# Patient Record
Sex: FEMALE | Race: WHITE | URBAN - METROPOLITAN AREA
[De-identification: names, ages, dates, MRNs, and addresses within clinical notes are randomized per-mention and may not be internally consistent; named-entity substitution may affect disease eponyms.]

---

## 2017-10-06 ENCOUNTER — IMPORTED ENCOUNTER (OUTPATIENT)
Dept: URBAN - METROPOLITAN AREA CLINIC 31 | Facility: CLINIC | Age: 62
End: 2017-10-06

## 2017-10-06 PROBLEM — H25.13: Noted: 2017-10-06

## 2017-10-06 PROBLEM — H02.834: Noted: 2017-10-06

## 2017-10-06 PROBLEM — H02.831: Noted: 2017-10-06

## 2017-10-06 PROBLEM — H04.123: Noted: 2017-10-06

## 2017-10-06 PROBLEM — E11.9: Noted: 2017-10-06

## 2017-10-06 PROCEDURE — 92015 DETERMINE REFRACTIVE STATE: CPT

## 2017-10-06 PROCEDURE — 92004 COMPRE OPH EXAM NEW PT 1/>: CPT

## 2017-11-10 ENCOUNTER — IMPORTED ENCOUNTER (OUTPATIENT)
Dept: URBAN - METROPOLITAN AREA CLINIC 31 | Facility: CLINIC | Age: 62
End: 2017-11-10

## 2017-11-10 PROBLEM — H02.831: Noted: 2017-11-10

## 2017-11-10 PROBLEM — E11.9: Noted: 2017-11-10

## 2017-11-10 PROBLEM — H04.123: Noted: 2017-11-10

## 2017-11-10 PROBLEM — H02.834: Noted: 2017-11-10

## 2017-11-10 PROBLEM — H25.13: Noted: 2017-11-10

## 2017-11-10 PROCEDURE — 92012 INTRM OPH EXAM EST PATIENT: CPT

## 2017-11-10 NOTE — PATIENT DISCUSSION
1. DM II without sign of Diabetic Retinopathy OU:    A1C getting better since off Steroids. Discussed the pathophysiology of diabetes and its effect on the eye. Stressed the importance of regular followup and good control of BS BP and Lipids to avoid future complications. 2. Dry Eye OU:  Pt feels not much improvement with plugs. .. Shirmers 25/25. Flooded. Pt wants to try Keegan [place]. Pt using gel drops 3x per day and eyes feel pio and burn about afternoon. Continue current management with gel drops 4x and refresh PM sapphire at bedtime. Disc plugs vs Restasis and opted for plugs. Disc Maxitears and will check with her allergist to see if can take. Rx Xiidra bid. RTN 6 weeks OC   3. Trace Nuclear Sclerotic Cataract OU: Explained how cataracts can effect vision. Recommend clinical observation. The patient was advised to contact us if any change or worsening of vision. 4. Dermatochalasis OU:  Patient currently symptomatic. Refer to DR Fong or DR Fan./5.  Rx change6. Hx Lymphoma CA Fibromyalgia-  Gets IGG shots. 7.  Seeing allergist for shots starting today. 8. RTN 6 weeks OC - check dryness after starting Keegan Kekaha.  9. RTn 1 yr CE

## 2018-07-10 ENCOUNTER — IMPORTED ENCOUNTER (OUTPATIENT)
Dept: URBAN - METROPOLITAN AREA CLINIC 31 | Facility: CLINIC | Age: 63
End: 2018-07-10

## 2018-07-10 PROBLEM — H02.831: Noted: 2018-07-10

## 2018-07-10 PROBLEM — E11.9: Noted: 2018-07-10

## 2018-07-10 PROBLEM — H25.13: Noted: 2018-07-10

## 2018-07-10 PROBLEM — H02.834: Noted: 2018-07-10

## 2018-07-10 PROBLEM — H04.123: Noted: 2018-07-10

## 2018-07-10 PROCEDURE — 99214 OFFICE O/P EST MOD 30 MIN: CPT

## 2018-07-10 NOTE — PATIENT DISCUSSION
1. DM II--NO BDR OU:     A1C getting better since off Steroids. Discussed the pathophysiology of diabetes and its effect on the eye. Stressed the importance of regular followup and good control of BS BP and Lipids to avoid future complications. 2. Dry Eye OU/Sjogrens:  Pt feels better with plugs. .. Shirmers 25/25. Flooded. Pt wants have perm plugs put in. Inserted 6mm PPP ou LOwers sc incident. Clark Aldridge too expensive. Cont gel drops 3x per day and Refresh PM sapphire at bedtime. Disc plugs vs Restasis and opted for plugs. Disc Maxitears and will check with her allergist to see if can take. 3.  Trace Nuclear Sclerotic Cataract OU: Explained how cataracts can effect vision. Recommend clinical observation. The patient was advised to contact us if any change or worsening of vision. 4. Dermatochalasis OU:  Patient currently symptomatic. Makes pt feel lids and eyes are tired. Refer to DR Fong or DR Fan./5. Dx Sjogrens  6/18- no meds yet. 6. Hx Lymphoma CA Fibromyalgia-  Gets IGG shots. 7.  Seeing allergist for shots starting today. 8. RTN 6 weeks OC - check dryness after PPP9.   RTN 10/18 CE

## 2018-08-14 ENCOUNTER — IMPORTED ENCOUNTER (OUTPATIENT)
Dept: URBAN - METROPOLITAN AREA CLINIC 31 | Facility: CLINIC | Age: 63
End: 2018-08-14

## 2018-08-14 PROBLEM — H02.834: Noted: 2018-08-14

## 2018-08-14 PROBLEM — H02.831: Noted: 2018-08-14

## 2018-08-14 PROBLEM — E11.9: Noted: 2018-08-14

## 2018-08-14 PROBLEM — H04.123: Noted: 2018-08-14

## 2018-08-14 PROBLEM — H25.13: Noted: 2018-08-14

## 2018-08-14 PROCEDURE — 99214 OFFICE O/P EST MOD 30 MIN: CPT

## 2018-08-14 PROCEDURE — A4263 PERMANENT TEAR DUCT PLUG: HCPCS

## 2018-08-14 NOTE — PATIENT DISCUSSION
1. DM II--NO BDR OU:     A1C getting better since off Steroids. Discussed the pathophysiology of diabetes and its effect on the eye. Stressed the importance of regular followup and good control of BS BP and Lipids to avoid future complications. 2. Dry Eye OU/Sjogrens:  Pt does well with PPP. Pt lost her right PPP. Inserted 7mm PPP  in OD LOwers sc incident. Cookie Buys too expensive. Cont gel drops 3x per day and Refresh PM sapphire at bedtime. Maxitears and will check with her allergist to see if can take. 3.  Trace Nuclear Sclerotic Cataract OU: Explained how cataracts can effect vision. Recommend clinical observation. The patient was advised to contact us if any change or worsening of vision. 4. Dermatochalasis OU:  Patient has been approved for lid sx and getting lowers too. .5. Dx Sjogrens  6/18- no meds yet. 6. Hx Lymphoma CA Fibromyalgia-  Gets IGG shots. 7.  Seeing allergist for shots starting today. 8. RTN 4 weeks OC - check dryness/Shirmers after PPP9.   RTN 10/18 CE

## 2018-09-18 ENCOUNTER — IMPORTED ENCOUNTER (OUTPATIENT)
Dept: URBAN - METROPOLITAN AREA CLINIC 31 | Facility: CLINIC | Age: 63
End: 2018-09-18

## 2018-09-18 PROBLEM — H02.831: Noted: 2018-09-18

## 2018-09-18 PROBLEM — E11.9: Noted: 2018-09-18

## 2018-09-18 PROBLEM — H04.123: Noted: 2018-09-18

## 2018-09-18 PROBLEM — H02.834: Noted: 2018-09-18

## 2018-09-18 PROBLEM — H25.13: Noted: 2018-09-18

## 2018-09-18 PROBLEM — H02.832: Noted: 2018-09-18

## 2018-09-18 PROCEDURE — 99213 OFFICE O/P EST LOW 20 MIN: CPT

## 2018-09-18 NOTE — PATIENT DISCUSSION
1. DM II--NO BDR OU:     A1C getting better since off Steroids. Discussed the pathophysiology of diabetes and its effect on the eye. Stressed the importance of regular followup and good control of BS BP and Lipids to avoid future complications. 2. Dry Eye OU/Sjogrens:  Pt does well with PPP. Pt has PPP in RLLL but none in left. Shavonne Olvera too expensive. Cont gel drops 3x per day and Refresh PM sapphire at bedtime. Maxitears and will check with her allergist to see if can take. 3.  Trace Nuclear Sclerotic Cataract OU: Explained how cataracts can effect vision. Recommend clinical observation. The patient was advised to contact us if any change or worsening of vision. 4. Dermatochalasis OU:  Patient has been approved for lid sx and getting lowers too. .  sx 10/1/18 [place]5. Dx Sjogrens  6/18- no meds yet. 6. Hx Lymphoma CA Fibromyalgia-  Gets IGG shots. 7.  Seeing allergist for shots starting today. 8.   RTN  12/18 CE

## 2019-01-15 ENCOUNTER — IMPORTED ENCOUNTER (OUTPATIENT)
Dept: URBAN - METROPOLITAN AREA CLINIC 31 | Facility: CLINIC | Age: 64
End: 2019-01-15

## 2019-01-15 PROBLEM — H02.831: Noted: 2019-01-15

## 2019-01-15 PROBLEM — E11.9: Noted: 2019-01-15

## 2019-01-15 PROBLEM — H02.834: Noted: 2019-01-15

## 2019-01-15 PROBLEM — H04.123: Noted: 2019-01-15

## 2019-01-15 PROBLEM — H25.13: Noted: 2019-01-15

## 2019-01-15 PROCEDURE — 92015 DETERMINE REFRACTIVE STATE: CPT

## 2019-01-15 PROCEDURE — 92014 COMPRE OPH EXAM EST PT 1/>: CPT

## 2019-01-15 NOTE — PATIENT DISCUSSION
1. DM II--NO BDR OU:     A1C getting better since off Steroids. Discussed the pathophysiology of diabetes and its effect on the eye. Stressed the importance of regular followup and good control of BS BP and Lipids to avoid future complications. 2. Dry Eye OU/Sjogrens:  Pt doing well with drops and sapphire. Plugs are gone ou. Pt feels dryness is much better since lid sx. Hong Stan too expensive. Cont gel drops 3x per day and Refresh PM sapphire at bedtime. Maxitears and will check with her allergist to see if can take. 3.  Trace Nuclear Sclerotic Cataract OU: Explained how cataracts can effect vision. Recommend clinical observation. The patient was advised to contact us if any change or worsening of vision. 4. S/P BULB 10/18 DR Fong5. Dx Sjogrens  6/18- no meds yet. 6. Hx Lymphoma CA Fibromyalgia-  Gets IGG shots. 7.  Seeing allergist for shots-1x per week. 8. Wants reading rx only9.   RTN  6/19 OC/yessica/tear lab

## 2019-07-12 ENCOUNTER — IMPORTED ENCOUNTER (OUTPATIENT)
Dept: URBAN - METROPOLITAN AREA CLINIC 31 | Facility: CLINIC | Age: 64
End: 2019-07-12

## 2019-07-12 PROBLEM — H02.831: Noted: 2019-07-12

## 2019-07-12 PROBLEM — E11.9: Noted: 2019-07-12

## 2019-07-12 PROBLEM — H02.834: Noted: 2019-07-12

## 2019-07-12 PROBLEM — H04.123: Noted: 2019-07-12

## 2019-07-12 PROBLEM — H25.13: Noted: 2019-07-12

## 2019-07-12 PROCEDURE — 99213 OFFICE O/P EST LOW 20 MIN: CPT

## 2019-07-12 PROCEDURE — 83861 MICROFLUID ANALY TEARS: CPT

## 2019-07-12 NOTE — PATIENT DISCUSSION
1. DM II--NO BDR OU:     A1C getting better since off Steroids. Discussed the pathophysiology of diabetes and its effect on the eye. Stressed the importance of regular followup and good control of BS BP and Lipids to avoid future complications. 2. Dry Eye OU/Sjogrens:  Pt doing well with drops and sapphire. Plugs are gone ou. Pt feels dryness is much better since lid sx. Mau Sprinkles too expensive. Cont gel drops 3x per day and Refresh PM sapphire at bedtime. Maxitears and will check with her allergist to see if can take. Al[place] 13/7. Tear Lab  302/293.3. Trace Nuclear Sclerotic Cataract OU: Explained how cataracts can effect vision. Recommend clinical observation. The patient was advised to contact us if any change or worsening of vision. 4. S/P BULB 10/18 DR Fong5. Dx Sjogrens  6/18- no meds yet. 6. Hx Lymphoma CA Fibromyalgia-  Gets IGG shots. --  CA returned and will be starting radiation 8/19.7. Seeing allergist for shots-1x per week. 8. Wants reading rx only9.   RTN  1/20 CE/alka/tear lab

## 2019-12-31 NOTE — PATIENT DISCUSSION
New Prescription: doxycycline hyclate (doxycycline hyclate): tablet: 100 mg 1 tablet twice a day as directed by mouth 12-

## 2019-12-31 NOTE — PATIENT DISCUSSION
CHALAZION OS: PRESCRIBED WARM COMPRESSES, EYELID SCRUBS AND DOXYCYCLINE 100MG BID PO X 2 WEEKS AND MAXITROL KENYATTA QHS X 2 WEEKS. PROCEED WITH INCISION AND DRAINAGE TODAY.

## 2019-12-31 NOTE — PATIENT DISCUSSION
New Prescription: Maxitrol (neomycin-polymyxin-dexameth): ointment: 3.5-10,000-0.1 mg-unit/g-% a small amount at bedtime as directed into both eyes 12-

## 2020-07-15 NOTE — PATIENT DISCUSSION
1. DM II without sign of Diabetic Retinopathy OU:    A1C getting better since off Steroids. Discussed the pathophysiology of diabetes and its effect on the eye. Stressed the importance of regular followup and good control of BS BP and Lipids to avoid future complications. 2. Dry Eye OU:   Pt using gel drops 3x per day and eyes feel pio and burn about afternoon. Continue current management with gel drops 4x and refresh PM sapphire at bedtime. Disc plugs vs Restasis and opted for plugs. Disc Maxitears and will check with her allergist to see if can take. Inserted TPP 4 punctas-  5mm ou lowers and 3mm in ou uppers. 3.  Trace Nuclear Sclerotic Cataract OU: Explained how cataracts can effect vision. Recommend clinical observation. The patient was advised to contact us if any change or worsening of vision. 4. Dermatochalasis OU:  Patient currently symptomatic. Refer to DR Fong or DR Fan./5.  Rx change6. Hx Lymphoma CA Fibromyalgia-  Gets IGG shots. 7.  Seeing allergist for shots starting today. 8. RTN 4-6 weeks OC - check dryness9.  RTn 1 yr CE · Bipolar disorder follows with Dr Gregoroi Bridges    Continue clonazepam, lamictal, topiramate, and quetiapine

## 2020-08-28 ENCOUNTER — IMPORTED ENCOUNTER (OUTPATIENT)
Dept: URBAN - METROPOLITAN AREA CLINIC 31 | Facility: CLINIC | Age: 65
End: 2020-08-28

## 2020-08-28 PROBLEM — E11.9: Noted: 2020-08-28

## 2020-08-28 PROBLEM — H25.13: Noted: 2020-08-28

## 2020-08-28 PROBLEM — H02.831: Noted: 2020-08-28

## 2020-08-28 PROBLEM — H02.834: Noted: 2020-08-28

## 2020-08-28 PROBLEM — H04.123: Noted: 2020-08-28

## 2020-08-28 PROCEDURE — 92014 COMPRE OPH EXAM EST PT 1/>: CPT

## 2020-08-28 PROCEDURE — 92015 DETERMINE REFRACTIVE STATE: CPT

## 2020-08-28 PROCEDURE — 83861 MICROFLUID ANALY TEARS: CPT

## 2020-08-28 NOTE — PATIENT DISCUSSION
1. DM II--NO BDR OU:     A1C getting better since off Steroids. Discussed the pathophysiology of diabetes and its effect on the eye. Stressed the importance of regular followup and good control of BS BP and Lipids to avoid future complications. 2. Dry Eye OU/Sjogrens:  Pt doing well with drops and sapphire. Plugs are gone ou. Pt feels dryness is much better since lid sx. Demarionis Kanaris too expensive but we will send to pharmacy and seee if gets approved-- Cont gel drops 3x per day and Refresh PM sapphire at bedtime. Maxitears and will check with her allergist to see if can take. [place] 13/7. Tear Lab  300/310 from 302/293.3. Trace Nuclear Sclerotic Cataract OU: Explained how cataracts can effect vision. Recommend clinical observation. The patient was advised to contact us if any change or worsening of vision. 4. S/P BULB 10/18 DR Fong5. Dx Sjogrens  6/18- no meds yet. 6. Hx Lymphoma CA Fibromyalgia-  Gets IGG shots. --  CA returned and will be starting radiation 8/19.7. Seeing allergist for shots-1x per week. 8. Rx change---Wants reading rx only9. RTN  1/21  DF10.   RTN 8/21 CE/Alka/Tear Lab

## 2021-02-19 ENCOUNTER — IMPORTED ENCOUNTER (OUTPATIENT)
Dept: URBAN - METROPOLITAN AREA CLINIC 31 | Facility: CLINIC | Age: 66
End: 2021-02-19

## 2021-02-19 PROBLEM — E11.9: Noted: 2021-02-19

## 2021-02-19 PROBLEM — H02.831: Noted: 2021-02-19

## 2021-02-19 PROBLEM — H25.13: Noted: 2021-02-19

## 2021-02-19 PROBLEM — H02.834: Noted: 2021-02-19

## 2021-02-19 PROBLEM — H04.123: Noted: 2021-02-19

## 2021-02-19 PROCEDURE — 99214 OFFICE O/P EST MOD 30 MIN: CPT

## 2021-02-19 NOTE — PATIENT DISCUSSION
1. DM II--NO BDR OU:     A1C  5.1--Pt lost 30 pounds on [employer]. Discussed the pathophysiology of diabetes and its effect on the eye. Stressed the importance of regular followup and good control of BS BP and Lipids to avoid future complications. 2. Dry Eye OU/Sjogrens:  Pt doing well with drops and sapphire. Plugs are gone ou. Pt using Restasis bid. Hardly using any terars. Has gel drops. Maxitears and will check with her allergist to see if can take. [place] 13/7. Tear Lab  300/310 from 302/293.3. Trace Nuclear Sclerotic Cataract OU: Explained how cataracts can effect vision. Recommend clinical observation. The patient was advised to contact us if any change or worsening of vision. 4. S/P BULB 10/18 DR Fong5. Dx Sjogrens  6/18- no meds yet. 6. Hx Lymphoma CA Fibromyalgia-  Gets IGG shots. --  CA in remission. 7. Seeing allergist for shots-1x per week. 8. Schedule cl fitting-- MONO--     My day 1.75/3.50  9.   RTN 8/21 CE/Alka/Tear Lab

## 2021-03-01 ENCOUNTER — IMPORTED ENCOUNTER (OUTPATIENT)
Dept: URBAN - METROPOLITAN AREA CLINIC 31 | Facility: CLINIC | Age: 66
End: 2021-03-01

## 2021-03-01 PROCEDURE — 92310 CONTACT LENS FITTING OU: CPT

## 2021-03-01 NOTE — PATIENT DISCUSSION
1.  Disp trials of MONO--MY Day 1.75/3.50. Increase wt slwly and use tears often. Tauhgt I/R and care Eval 1302.   RTN 1 week clck

## 2021-03-08 ENCOUNTER — IMPORTED ENCOUNTER (OUTPATIENT)
Dept: URBAN - METROPOLITAN AREA CLINIC 31 | Facility: CLINIC | Age: 66
End: 2021-03-08

## 2021-03-10 ENCOUNTER — IMPORTED ENCOUNTER (OUTPATIENT)
Dept: URBAN - METROPOLITAN AREA CLINIC 31 | Facility: CLINIC | Age: 66
End: 2021-03-10

## 2021-03-10 NOTE — PATIENT DISCUSSION
1. Presbyope-- pT doing well with cls--up to 6-7 hrs and using tears. Needs more near rx. Disp trials of MONO--MY Day 2.25/4.00-0.75x090.  10 trials. Wanted 3.75wc for left eye but did not have it in stock.   2.  RTN 1 week clck

## 2021-03-18 ENCOUNTER — IMPORTED ENCOUNTER (OUTPATIENT)
Dept: URBAN - METROPOLITAN AREA CLINIC 31 | Facility: CLINIC | Age: 66
End: 2021-03-18

## 2021-10-06 ENCOUNTER — IMPORTED ENCOUNTER (OUTPATIENT)
Dept: URBAN - METROPOLITAN AREA CLINIC 31 | Facility: CLINIC | Age: 66
End: 2021-10-06

## 2021-10-06 PROBLEM — E11.9: Noted: 2021-10-06

## 2021-10-06 PROBLEM — H04.123: Noted: 2021-10-06

## 2021-10-06 PROBLEM — H25.13: Noted: 2021-10-06

## 2021-10-06 PROBLEM — H02.834: Noted: 2021-10-06

## 2021-10-06 PROBLEM — H25.11: Noted: 2021-10-06

## 2021-10-06 PROBLEM — H02.831: Noted: 2021-10-06

## 2021-10-06 PROBLEM — H25.12: Noted: 2021-10-06

## 2021-10-06 PROBLEM — H16.223: Noted: 2021-10-06

## 2021-10-06 PROCEDURE — 92014 COMPRE OPH EXAM EST PT 1/>: CPT

## 2021-10-06 PROCEDURE — 92015 DETERMINE REFRACTIVE STATE: CPT

## 2021-10-06 NOTE — PATIENT DISCUSSION
1. DM II--NO BDR OU:     A1C  5.1--Pt lost 30 pounds on [employer]. Discussed the pathophysiology of diabetes and its effect on the eye. Stressed the importance of regular followup and good control of BS BP and Lipids to avoid future complications. 2. Dry Eye OU/Sjogrens:  Doing well with drops and sapphire. Plugs are gone ou. Pt using Restasis bid. Hardly using any tears. Has gel drops. Ana. Alka 5/11 from 13/7. Tear Lab  300/310 from 302/293.3. Trace Nuclear Sclerotic Cataract OU: Explained how cataracts can effect vision. Recommend clinical observation. The patient was advised to contact us if any change or worsening of vision. 4. S/P BULB 10/18 DR Fong5. Dx Sjogrens  6/18- no meds yet. 6. Hx Lymphoma CA Fibromyalgia-  Gets IGG shots. --  CA in remission. 7. Seeing allergist for shots-1x per week. 8. Pt doing well with cls--up to 6-7 hrs and using tears. Cont with MONO--MY Day 2.25/4.00-0.75x090. NO FIT TODAY--Do not wear untiol pt is off steroid--drying pt out. 9.  RTN  4/22 OC/Alka/Tear Lab10.   RTN 1 yr CE/Tear lab

## 2022-04-01 ASSESSMENT — TONOMETRY
OS_IOP_MMHG: 14
OD_IOP_MMHG: 15
OD_IOP_MMHG: 11
OD_IOP_MMHG: 12
OS_IOP_MMHG: 13
OD_IOP_MMHG: 14
OS_IOP_MMHG: 16
OS_IOP_MMHG: 15
OD_IOP_MMHG: 14
OS_IOP_MMHG: 12

## 2022-04-01 ASSESSMENT — VISUAL ACUITY
OS_SC: 20/25
OD_SC: 20/20-2
OS_SC: 20/25
OS_SC: 20/30+1
OS_SC: 20/20-1
OS_SC: 20/25
OS_CC: J1+
OS_SC: 20/25
OD_SC: 20/20
OS_CC: 20/40
OD_CC: J1+
OD_SC: 20/25
OD_CC: 20/50
OD_SC: 20/25-3
OD_SC: 20/25
OD_SC: 20/30-2

## 2022-05-11 ENCOUNTER — ESTABLISHED PATIENT (OUTPATIENT)
Dept: URBAN - METROPOLITAN AREA CLINIC 34 | Facility: CLINIC | Age: 67
End: 2022-05-11

## 2022-05-11 DIAGNOSIS — H04.123: ICD-10-CM

## 2022-05-11 PROCEDURE — 99213 OFFICE O/P EST LOW 20 MIN: CPT

## 2022-05-11 ASSESSMENT — VISUAL ACUITY
OD_CC: 20/25
OS_CC: 20/25

## 2022-05-11 ASSESSMENT — TONOMETRY
OD_IOP_MMHG: 14
OS_IOP_MMHG: 14

## 2022-05-11 NOTE — PATIENT DISCUSSION
Pt doing well with cls--up to 6-7 hrs and using tears. Cont with MONO--MY Day +2.25/+4.00-0.75x090. NO FIT TODAY--.

## 2022-05-11 NOTE — PATIENT DISCUSSION
Sjogrens: Doing well with drops and sapphire. Plugs are gone ou. Pt using Restasis bid. Hardly using any tears. Has gel drops. Lisa Rucker 5/11 from 13/7. Tear Lab 300/310 from 302/293. Monitor.

## 2022-11-10 ENCOUNTER — ESTABLISHED PATIENT (OUTPATIENT)
Dept: URBAN - METROPOLITAN AREA CLINIC 34 | Facility: CLINIC | Age: 67
End: 2022-11-10

## 2022-11-10 DIAGNOSIS — H25.89: ICD-10-CM

## 2022-11-10 DIAGNOSIS — H04.123: ICD-10-CM

## 2022-11-10 DIAGNOSIS — H25.13: ICD-10-CM

## 2022-11-10 PROCEDURE — 83861 MICROFLUID ANALY TEARS: CPT

## 2022-11-10 PROCEDURE — 92014 COMPRE OPH EXAM EST PT 1/>: CPT

## 2022-11-10 PROCEDURE — 92015 DETERMINE REFRACTIVE STATE: CPT

## 2022-11-10 ASSESSMENT — VISUAL ACUITY
OS_CC: J3
OS_BAT: 20/70
OS_CC: 20/50-1
OD_BAT: 20/50
OD_CC: J3
OD_CC: 20/40

## 2022-11-10 ASSESSMENT — TONOMETRY
OD_IOP_MMHG: 14
OS_IOP_MMHG: 14

## 2022-11-10 NOTE — PATIENT DISCUSSION
Cataract(s) are getting worse and starting to affcet her viisons-=-- Recommend monitoring, and patient agrees with this plan.

## 2022-11-10 NOTE — PATIENT DISCUSSION
Pt doing well with cls--up to 6-7 hrs and using tears. Cont with MONO--MY Day +2.25/+4.00-0.75x090. Has plenty at home--Near is getting harder due to cataract---NO FIT TODAY--.

## 2023-05-12 ENCOUNTER — ESTABLISHED PATIENT (OUTPATIENT)
Dept: URBAN - METROPOLITAN AREA CLINIC 34 | Facility: CLINIC | Age: 68
End: 2023-05-12

## 2023-05-12 DIAGNOSIS — H04.123: ICD-10-CM

## 2023-05-12 DIAGNOSIS — H25.13: ICD-10-CM

## 2023-05-12 DIAGNOSIS — H25.89: ICD-10-CM

## 2023-05-12 PROCEDURE — 92133 CPTRZD OPH DX IMG PST SGM ON: CPT

## 2023-05-12 PROCEDURE — 99214 OFFICE O/P EST MOD 30 MIN: CPT

## 2023-05-12 PROCEDURE — 92015 DETERMINE REFRACTIVE STATE: CPT

## 2023-05-12 ASSESSMENT — VISUAL ACUITY
OS_CC: 20/20-1
OD_CC: 20/20-1
OS_CC: 20/50+2
OD_CC: 20/25+2
OS_PH: 20/25-3

## 2023-05-12 ASSESSMENT — TONOMETRY
OD_IOP_MMHG: 19
OS_IOP_MMHG: 18

## 2024-03-27 ENCOUNTER — COMPREHENSIVE EXAM (OUTPATIENT)
Dept: URBAN - METROPOLITAN AREA CLINIC 34 | Facility: CLINIC | Age: 69
End: 2024-03-27

## 2024-03-27 DIAGNOSIS — H40.1431: ICD-10-CM

## 2024-03-27 DIAGNOSIS — H25.89: ICD-10-CM

## 2024-03-27 DIAGNOSIS — H04.123: ICD-10-CM

## 2024-03-27 DIAGNOSIS — E11.9: ICD-10-CM

## 2024-03-27 DIAGNOSIS — H25.13: ICD-10-CM

## 2024-03-27 PROCEDURE — 92015 DETERMINE REFRACTIVE STATE: CPT

## 2024-03-27 PROCEDURE — 92014 COMPRE OPH EXAM EST PT 1/>: CPT

## 2024-03-27 PROCEDURE — 92250 FUNDUS PHOTOGRAPHY W/I&R: CPT

## 2024-03-27 PROCEDURE — 92083 EXTENDED VISUAL FIELD XM: CPT

## 2024-03-27 ASSESSMENT — TONOMETRY
OS_IOP_MMHG: 17
OD_IOP_MMHG: 14

## 2024-03-27 ASSESSMENT — VISUAL ACUITY
OD_CC: 20/30
OS_CC: 20/30
OD_BAT: 20/70
OS_BAT: 20/100
OD_CC: 20/30
OS_CC: 20/40

## 2024-04-15 ENCOUNTER — CONSULTATION/EVALUATION (OUTPATIENT)
Dept: URBAN - METROPOLITAN AREA CLINIC 29 | Facility: CLINIC | Age: 69
End: 2024-04-15

## 2024-04-15 DIAGNOSIS — H40.1431: ICD-10-CM

## 2024-04-15 DIAGNOSIS — H02.834: ICD-10-CM

## 2024-04-15 DIAGNOSIS — H04.123: ICD-10-CM

## 2024-04-15 DIAGNOSIS — H02.831: ICD-10-CM

## 2024-04-15 DIAGNOSIS — E11.9: ICD-10-CM

## 2024-04-15 DIAGNOSIS — H25.13: ICD-10-CM

## 2024-04-15 DIAGNOSIS — H25.89: ICD-10-CM

## 2024-04-15 PROCEDURE — 99214 OFFICE O/P EST MOD 30 MIN: CPT

## 2024-04-15 PROCEDURE — 92136 OPHTHALMIC BIOMETRY: CPT

## 2024-04-15 ASSESSMENT — TONOMETRY
OS_IOP_MMHG: 16
OD_IOP_MMHG: 16

## 2024-04-15 ASSESSMENT — VISUAL ACUITY
OD_BAT: 20/70
OS_CC: 20/40
OS_BAT: 20/100
OD_CC: 20/30

## 2024-04-30 ENCOUNTER — SURGERY/PROCEDURE (OUTPATIENT)
Dept: URBAN - METROPOLITAN AREA SURGERY 17 | Facility: SURGERY | Age: 69
End: 2024-04-30

## 2024-04-30 DIAGNOSIS — H25.12: ICD-10-CM

## 2024-04-30 PROCEDURE — 66984 XCAPSL CTRC RMVL W/O ECP: CPT

## 2024-05-01 ENCOUNTER — POST-OP (OUTPATIENT)
Dept: URBAN - METROPOLITAN AREA CLINIC 34 | Facility: CLINIC | Age: 69
End: 2024-05-01

## 2024-05-01 DIAGNOSIS — Z96.1: ICD-10-CM

## 2024-05-01 PROCEDURE — 99024 POSTOP FOLLOW-UP VISIT: CPT

## 2024-05-01 ASSESSMENT — VISUAL ACUITY
OD_PH: 20/50
OS_SC: 20/200
OD_SC: 20/70
OD_BAT: 20/70

## 2024-05-01 ASSESSMENT — TONOMETRY
OS_IOP_MMHG: 16
OD_IOP_MMHG: 21

## 2024-05-07 ENCOUNTER — SURGERY/PROCEDURE (OUTPATIENT)
Dept: URBAN - METROPOLITAN AREA SURGERY 17 | Facility: SURGERY | Age: 69
End: 2024-05-07

## 2024-05-07 DIAGNOSIS — H25.11: ICD-10-CM

## 2024-05-07 PROCEDURE — 66984 XCAPSL CTRC RMVL W/O ECP: CPT | Mod: 79,RT

## 2024-05-08 ENCOUNTER — POST-OP (OUTPATIENT)
Dept: URBAN - METROPOLITAN AREA CLINIC 34 | Facility: CLINIC | Age: 69
End: 2024-05-08

## 2024-05-08 DIAGNOSIS — Z96.1: ICD-10-CM

## 2024-05-08 PROCEDURE — 99024 POSTOP FOLLOW-UP VISIT: CPT

## 2024-05-08 ASSESSMENT — TONOMETRY
OS_IOP_MMHG: 14
OD_IOP_MMHG: 16

## 2024-05-08 ASSESSMENT — VISUAL ACUITY
OS_PH: 20/25
OD_SC: 20/50
OS_SC: 20/30+2

## 2024-05-13 ENCOUNTER — POST-OP (OUTPATIENT)
Dept: URBAN - METROPOLITAN AREA CLINIC 34 | Facility: CLINIC | Age: 69
End: 2024-05-13

## 2024-05-13 DIAGNOSIS — Z96.1: ICD-10-CM

## 2024-05-13 PROCEDURE — 99024 POSTOP FOLLOW-UP VISIT: CPT

## 2024-05-13 ASSESSMENT — VISUAL ACUITY
OS_SC: 20/30
OD_SC: 20/25-2

## 2024-05-13 ASSESSMENT — TONOMETRY
OS_IOP_MMHG: 16
OD_IOP_MMHG: 13

## 2024-06-28 ENCOUNTER — POST-OP (OUTPATIENT)
Dept: URBAN - METROPOLITAN AREA CLINIC 34 | Facility: CLINIC | Age: 69
End: 2024-06-28

## 2024-06-28 DIAGNOSIS — Z96.1: ICD-10-CM

## 2024-06-28 PROCEDURE — 99024 POSTOP FOLLOW-UP VISIT: CPT

## 2024-06-28 ASSESSMENT — VISUAL ACUITY
OS_SC: 20/30-2
OD_SC: 20/25-1
OD_SC: 20/40
OS_SC: 20/70-1

## 2024-06-28 ASSESSMENT — TONOMETRY
OD_IOP_MMHG: 13
OS_IOP_MMHG: 15

## 2024-07-15 ENCOUNTER — EMERGENCY VISIT (OUTPATIENT)
Dept: URBAN - METROPOLITAN AREA CLINIC 34 | Facility: CLINIC | Age: 69
End: 2024-07-15

## 2024-07-15 DIAGNOSIS — H57.12: ICD-10-CM

## 2024-07-15 PROCEDURE — 99024 POSTOP FOLLOW-UP VISIT: CPT

## 2024-07-15 RX ORDER — PREDNISOLONE ACETATE 10 MG/ML
1 SUSPENSION/ DROPS OPHTHALMIC
Start: 2024-07-15

## 2024-07-15 ASSESSMENT — VISUAL ACUITY
OS_SC: 20/200
OS_PH: 20/100-2
OD_SC: 20/25

## 2024-07-16 ENCOUNTER — NEW PATIENT (OUTPATIENT)
Dept: URBAN - METROPOLITAN AREA CLINIC 26 | Facility: CLINIC | Age: 69
End: 2024-07-16

## 2024-07-16 VITALS
HEART RATE: 63 BPM | BODY MASS INDEX: 28 KG/M2 | HEIGHT: 63 IN | DIASTOLIC BLOOD PRESSURE: 70 MMHG | WEIGHT: 158 LBS | SYSTOLIC BLOOD PRESSURE: 102 MMHG

## 2024-07-16 DIAGNOSIS — H18.20: ICD-10-CM

## 2024-07-16 DIAGNOSIS — H40.1431: ICD-10-CM

## 2024-07-16 DIAGNOSIS — E11.9: ICD-10-CM

## 2024-07-16 DIAGNOSIS — H57.12: ICD-10-CM

## 2024-07-16 DIAGNOSIS — H44.112: ICD-10-CM

## 2024-07-16 DIAGNOSIS — H35.372: ICD-10-CM

## 2024-07-16 DIAGNOSIS — H30.91: ICD-10-CM

## 2024-07-16 DIAGNOSIS — H04.123: ICD-10-CM

## 2024-07-16 PROCEDURE — 92004 COMPRE OPH EXAM NEW PT 1/>: CPT

## 2024-07-16 PROCEDURE — 92134 CPTRZ OPH DX IMG PST SGM RTA: CPT

## 2024-07-16 PROCEDURE — 92235 FLUORESCEIN ANGRPH MLTIFRAME: CPT

## 2024-07-16 PROCEDURE — 92250 FUNDUS PHOTOGRAPHY W/I&R: CPT | Mod: NC

## 2024-07-16 RX ORDER — CYCLOPENTOLATE HYDROCHLORIDE 20 MG/ML: 1 SOLUTION/ DROPS OPHTHALMIC TWICE A DAY

## 2024-07-16 ASSESSMENT — VISUAL ACUITY
OD_SC: 20/20-2
OS_PH: 20/70-1
OS_SC: 20/100-2

## 2024-07-16 ASSESSMENT — TONOMETRY
OD_IOP_MMHG: 16
OD_IOP_MMHG: 17
OS_IOP_MMHG: 14
OS_IOP_MMHG: 17

## 2024-07-26 ENCOUNTER — FOLLOW UP (OUTPATIENT)
Dept: URBAN - METROPOLITAN AREA CLINIC 26 | Facility: CLINIC | Age: 69
End: 2024-07-26

## 2024-07-26 DIAGNOSIS — H18.20: ICD-10-CM

## 2024-07-26 DIAGNOSIS — Z96.1: ICD-10-CM

## 2024-07-26 DIAGNOSIS — H57.12: ICD-10-CM

## 2024-07-26 DIAGNOSIS — H40.1431: ICD-10-CM

## 2024-07-26 DIAGNOSIS — H44.112: ICD-10-CM

## 2024-07-26 DIAGNOSIS — H30.91: ICD-10-CM

## 2024-07-26 DIAGNOSIS — H35.372: ICD-10-CM

## 2024-07-26 DIAGNOSIS — H04.123: ICD-10-CM

## 2024-07-26 DIAGNOSIS — E11.9: ICD-10-CM

## 2024-07-26 PROCEDURE — 92014 COMPRE OPH EXAM EST PT 1/>: CPT

## 2024-07-26 RX ORDER — PREDNISONE 20MG 20 MG/1: 1 TABLET ORAL

## 2024-07-26 ASSESSMENT — VISUAL ACUITY
OD_SC: 20/20-1
OS_PH: 20/40
OS_SC: 20/100+2

## 2024-07-26 ASSESSMENT — TONOMETRY
OS_IOP_MMHG: 10
OD_IOP_MMHG: 10

## 2024-11-12 ENCOUNTER — FOLLOW UP (OUTPATIENT)
Dept: URBAN - METROPOLITAN AREA CLINIC 34 | Facility: CLINIC | Age: 69
End: 2024-11-12

## 2024-11-12 DIAGNOSIS — H44.113: ICD-10-CM

## 2024-11-12 DIAGNOSIS — Z96.1: ICD-10-CM

## 2024-11-12 DIAGNOSIS — H57.12: ICD-10-CM

## 2024-11-12 PROCEDURE — 99214 OFFICE O/P EST MOD 30 MIN: CPT

## 2025-05-30 ENCOUNTER — COMPREHENSIVE EXAM (OUTPATIENT)
Age: 70
End: 2025-05-30

## 2025-05-30 DIAGNOSIS — H35.063: ICD-10-CM

## 2025-05-30 DIAGNOSIS — Z96.1: ICD-10-CM

## 2025-05-30 DIAGNOSIS — H44.113: ICD-10-CM

## 2025-05-30 PROCEDURE — 92014 COMPRE OPH EXAM EST PT 1/>: CPT

## 2025-05-30 PROCEDURE — 92015 DETERMINE REFRACTIVE STATE: CPT

## 2025-05-30 PROCEDURE — 92134 CPTRZ OPH DX IMG PST SGM RTA: CPT
